# Patient Record
Sex: MALE | Race: BLACK OR AFRICAN AMERICAN | NOT HISPANIC OR LATINO | Employment: UNEMPLOYED | URBAN - METROPOLITAN AREA
[De-identification: names, ages, dates, MRNs, and addresses within clinical notes are randomized per-mention and may not be internally consistent; named-entity substitution may affect disease eponyms.]

---

## 2021-05-12 ENCOUNTER — PATIENT MESSAGE (OUTPATIENT)
Dept: RESEARCH | Facility: HOSPITAL | Age: 56
End: 2021-05-12

## 2021-10-13 PROBLEM — T88.4XXA HARD TO INTUBATE: Status: ACTIVE | Noted: 2021-10-13

## 2021-10-13 PROBLEM — G47.33 OSA (OBSTRUCTIVE SLEEP APNEA): Status: ACTIVE | Noted: 2021-10-13

## 2021-10-13 PROBLEM — Z78.9 INTOLERANCE OF CONTINUOUS POSITIVE AIRWAY PRESSURE (CPAP) VENTILATION: Status: ACTIVE | Noted: 2021-10-13

## 2023-01-12 ENCOUNTER — TELEPHONE (OUTPATIENT)
Dept: RHEUMATOLOGY | Facility: CLINIC | Age: 58
End: 2023-01-12

## 2023-01-12 NOTE — TELEPHONE ENCOUNTER
----- Message from Karolina Isaac sent at 1/12/2023  3:29 PM CST -----  Contact: Leigha  Paperwork was faxed over for pt yesterday from [facility] and verification is needed to make sure the office received it. Pt is also requesting to see Dr. Rudolph, if possible.     Contact number: [phone] Shelly

## 2023-01-12 NOTE — TELEPHONE ENCOUNTER
Spoke with jim and let her know that the patient will be put on a wait list as there are no appointments until May

## 2023-03-22 ENCOUNTER — PATIENT MESSAGE (OUTPATIENT)
Dept: RHEUMATOLOGY | Facility: CLINIC | Age: 58
End: 2023-03-22